# Patient Record
Sex: MALE | Race: WHITE
[De-identification: names, ages, dates, MRNs, and addresses within clinical notes are randomized per-mention and may not be internally consistent; named-entity substitution may affect disease eponyms.]

---

## 2017-04-02 ENCOUNTER — HOSPITAL ENCOUNTER (OUTPATIENT)
Age: 27
Discharge: ANOTHER HOSPITAL | End: 2017-04-02
Payer: MEDICAID

## 2017-04-02 ENCOUNTER — HOSPITAL ENCOUNTER (EMERGENCY)
Age: 27
Discharge: HOME | End: 2017-04-02
Payer: MEDICAID

## 2017-04-02 DIAGNOSIS — E86.0: ICD-10-CM

## 2017-04-02 DIAGNOSIS — D47.3: ICD-10-CM

## 2017-04-02 DIAGNOSIS — K52.9: Primary | ICD-10-CM

## 2017-04-02 DIAGNOSIS — R10.9: Primary | ICD-10-CM

## 2017-04-02 DIAGNOSIS — H66.003: ICD-10-CM

## 2017-04-02 DIAGNOSIS — R51: ICD-10-CM

## 2017-04-02 DIAGNOSIS — Z83.79: ICD-10-CM

## 2017-04-02 PROCEDURE — 74177 CT ABD & PELVIS W/CONTRAST: CPT

## 2017-04-02 PROCEDURE — 83690 ASSAY OF LIPASE: CPT

## 2017-04-02 PROCEDURE — 80053 COMPREHEN METABOLIC PANEL: CPT

## 2017-04-02 PROCEDURE — 81003 URINALYSIS AUTO W/O SCOPE: CPT

## 2017-04-02 PROCEDURE — 86140 C-REACTIVE PROTEIN: CPT

## 2017-04-02 PROCEDURE — 80306 DRUG TEST PRSMV INSTRMNT: CPT

## 2017-04-02 PROCEDURE — 96374 THER/PROPH/DIAG INJ IV PUSH: CPT

## 2017-04-02 PROCEDURE — 96375 TX/PRO/DX INJ NEW DRUG ADDON: CPT

## 2017-04-02 PROCEDURE — 36415 COLL VENOUS BLD VENIPUNCTURE: CPT

## 2017-04-02 PROCEDURE — 70450 CT HEAD/BRAIN W/O DYE: CPT

## 2017-04-02 PROCEDURE — 99284 EMERGENCY DEPT VISIT MOD MDM: CPT

## 2017-04-02 PROCEDURE — 85025 COMPLETE CBC W/AUTO DIFF WBC: CPT

## 2017-04-24 ENCOUNTER — HOSPITAL ENCOUNTER (EMERGENCY)
Age: 27
Discharge: HOME | End: 2017-04-24
Payer: MEDICAID

## 2017-04-24 DIAGNOSIS — K52.9: ICD-10-CM

## 2017-04-24 DIAGNOSIS — K92.1: Primary | ICD-10-CM

## 2017-04-24 PROCEDURE — 96374 THER/PROPH/DIAG INJ IV PUSH: CPT

## 2017-04-24 PROCEDURE — 80053 COMPREHEN METABOLIC PANEL: CPT

## 2017-04-24 PROCEDURE — 96375 TX/PRO/DX INJ NEW DRUG ADDON: CPT

## 2017-04-24 PROCEDURE — 99284 EMERGENCY DEPT VISIT MOD MDM: CPT

## 2017-04-24 PROCEDURE — 36415 COLL VENOUS BLD VENIPUNCTURE: CPT

## 2017-04-24 PROCEDURE — 85651 RBC SED RATE NONAUTOMATED: CPT

## 2017-04-24 PROCEDURE — 85025 COMPLETE CBC W/AUTO DIFF WBC: CPT

## 2017-04-24 PROCEDURE — 96376 TX/PRO/DX INJ SAME DRUG ADON: CPT

## 2017-04-24 PROCEDURE — 83690 ASSAY OF LIPASE: CPT

## 2017-06-15 ENCOUNTER — HOSPITAL ENCOUNTER (OUTPATIENT)
Dept: HOSPITAL 76 - LAB | Age: 27
Discharge: HOME | End: 2017-06-15
Attending: INTERNAL MEDICINE
Payer: MEDICAID

## 2017-06-15 DIAGNOSIS — D47.3: Primary | ICD-10-CM

## 2017-06-15 LAB
BASOPHILS NFR BLD AUTO: 0.1 10^3/UL (ref 0–0.1)
BASOPHILS NFR BLD AUTO: 1.3 %
EOSINOPHIL # BLD AUTO: 0.1 10^3/UL (ref 0–0.7)
EOSINOPHIL NFR BLD AUTO: 2 %
ERYTHROCYTE [DISTWIDTH] IN BLOOD BY AUTOMATED COUNT: 13.9 % (ref 12–15)
HCT VFR BLD AUTO: 43.3 % (ref 42–52)
HGB UR QL STRIP: 14.6 G/DL (ref 14–18)
LYMPHOCYTES # SPEC AUTO: 1.9 10^3/UL (ref 1.5–3.5)
LYMPHOCYTES NFR BLD AUTO: 26.9 %
MCH RBC QN AUTO: 30.2 PG (ref 27–31)
MCHC RBC AUTO-ENTMCNC: 33.7 G/DL (ref 32–36)
MCV RBC AUTO: 89.5 FL (ref 80–94)
MONOCYTES # BLD AUTO: 0.4 10^3/UL (ref 0–1)
MONOCYTES NFR BLD AUTO: 5.1 %
NEUTROPHILS # BLD AUTO: 4.6 10^3/UL (ref 1.5–6.6)
NEUTROPHILS # SNV AUTO: 7 X10^3/UL (ref 4.8–10.8)
NEUTROPHILS NFR BLD AUTO: 64.7 %
NRBC # BLD AUTO: 0 /100WBC
PDW BLD AUTO: 7 FL (ref 7.4–11.4)
RBC MAR: 4.84 10^6/UL (ref 4.7–6.1)
WBC # BLD: 7 X10^3/UL

## 2017-06-15 PROCEDURE — 85025 COMPLETE CBC W/AUTO DIFF WBC: CPT

## 2017-06-15 PROCEDURE — 36415 COLL VENOUS BLD VENIPUNCTURE: CPT

## 2017-06-22 ENCOUNTER — HOSPITAL ENCOUNTER (OUTPATIENT)
Dept: HOSPITAL 76 - LAB | Age: 27
Discharge: HOME | End: 2017-06-22
Attending: INTERNAL MEDICINE
Payer: MEDICAID

## 2017-06-22 DIAGNOSIS — D47.3: Primary | ICD-10-CM

## 2017-06-22 LAB
BASOPHILS NFR BLD AUTO: 0.1 10^3/UL (ref 0–0.1)
BASOPHILS NFR BLD AUTO: 1.6 %
EOSINOPHIL # BLD AUTO: 0.1 10^3/UL (ref 0–0.7)
EOSINOPHIL NFR BLD AUTO: 2.4 %
ERYTHROCYTE [DISTWIDTH] IN BLOOD BY AUTOMATED COUNT: 13.6 % (ref 12–15)
HCT VFR BLD AUTO: 45.2 % (ref 42–52)
HGB UR QL STRIP: 15.1 G/DL (ref 14–18)
LYMPHOCYTES # SPEC AUTO: 1.7 10^3/UL (ref 1.5–3.5)
LYMPHOCYTES NFR BLD AUTO: 30.2 %
MCH RBC QN AUTO: 29.9 PG (ref 27–31)
MCHC RBC AUTO-ENTMCNC: 33.5 G/DL (ref 32–36)
MCV RBC AUTO: 89.1 FL (ref 80–94)
MONOCYTES # BLD AUTO: 0.3 10^3/UL (ref 0–1)
MONOCYTES NFR BLD AUTO: 6 %
NEUTROPHILS # BLD AUTO: 3.5 10^3/UL (ref 1.5–6.6)
NEUTROPHILS # SNV AUTO: 5.8 X10^3/UL (ref 4.8–10.8)
NEUTROPHILS NFR BLD AUTO: 59.8 %
NRBC # BLD AUTO: 0 /100WBC
PDW BLD AUTO: 6.8 FL (ref 7.4–11.4)
RBC MAR: 5.07 10^6/UL (ref 4.7–6.1)
WBC # BLD: 5.8 X10^3/UL

## 2017-06-22 PROCEDURE — 36415 COLL VENOUS BLD VENIPUNCTURE: CPT

## 2017-06-22 PROCEDURE — 85025 COMPLETE CBC W/AUTO DIFF WBC: CPT

## 2017-06-29 ENCOUNTER — HOSPITAL ENCOUNTER (OUTPATIENT)
Dept: HOSPITAL 76 - LAB | Age: 27
Discharge: HOME | End: 2017-06-29
Attending: INTERNAL MEDICINE
Payer: MEDICAID

## 2017-06-29 DIAGNOSIS — D47.3: Primary | ICD-10-CM

## 2017-06-29 LAB
BASOPHILS NFR BLD AUTO: 0.1 10^3/UL (ref 0–0.1)
BASOPHILS NFR BLD AUTO: 1.5 %
EOSINOPHIL # BLD AUTO: 0.2 10^3/UL (ref 0–0.7)
EOSINOPHIL NFR BLD AUTO: 2.4 %
ERYTHROCYTE [DISTWIDTH] IN BLOOD BY AUTOMATED COUNT: 14.2 % (ref 12–15)
HCT VFR BLD AUTO: 43.9 % (ref 42–52)
HGB UR QL STRIP: 14.6 G/DL (ref 14–18)
LYMPHOCYTES # SPEC AUTO: 2.5 10^3/UL (ref 1.5–3.5)
LYMPHOCYTES NFR BLD AUTO: 33 %
MCH RBC QN AUTO: 29.8 PG (ref 27–31)
MCHC RBC AUTO-ENTMCNC: 33.3 G/DL (ref 32–36)
MCV RBC AUTO: 89.7 FL (ref 80–94)
MONOCYTES # BLD AUTO: 0.4 10^3/UL (ref 0–1)
MONOCYTES NFR BLD AUTO: 5.2 %
NEUTROPHILS # BLD AUTO: 4.5 10^3/UL (ref 1.5–6.6)
NEUTROPHILS # SNV AUTO: 7.7 X10^3/UL (ref 4.8–10.8)
NEUTROPHILS NFR BLD AUTO: 57.9 %
NRBC # BLD AUTO: 0 /100WBC
PDW BLD AUTO: 7.2 FL (ref 7.4–11.4)
RBC MAR: 4.9 10^6/UL (ref 4.7–6.1)
WBC # BLD: 7.7 X10^3/UL

## 2017-06-29 PROCEDURE — 85025 COMPLETE CBC W/AUTO DIFF WBC: CPT

## 2017-06-29 PROCEDURE — 36415 COLL VENOUS BLD VENIPUNCTURE: CPT

## 2017-07-06 ENCOUNTER — HOSPITAL ENCOUNTER (OUTPATIENT)
Dept: HOSPITAL 76 - LAB | Age: 27
Discharge: HOME | End: 2017-07-06
Attending: INTERNAL MEDICINE
Payer: MEDICAID

## 2017-07-06 DIAGNOSIS — D47.3: Primary | ICD-10-CM

## 2017-07-06 LAB
BASOPHILS NFR BLD AUTO: 0 10^3/UL (ref 0–0.1)
BASOPHILS NFR BLD AUTO: 0.2 %
EOSINOPHIL # BLD AUTO: 0.1 10^3/UL (ref 0–0.7)
EOSINOPHIL NFR BLD AUTO: 2.2 %
ERYTHROCYTE [DISTWIDTH] IN BLOOD BY AUTOMATED COUNT: 14.4 % (ref 12–15)
HCT VFR BLD AUTO: 45.1 % (ref 42–52)
HGB UR QL STRIP: 15.2 G/DL (ref 14–18)
LYMPHOCYTES # SPEC AUTO: 1.9 10^3/UL (ref 1.5–3.5)
LYMPHOCYTES NFR BLD AUTO: 30.2 %
MCH RBC QN AUTO: 30.2 PG (ref 27–31)
MCHC RBC AUTO-ENTMCNC: 33.8 G/DL (ref 32–36)
MCV RBC AUTO: 89.4 FL (ref 80–94)
MONOCYTES # BLD AUTO: 0.3 10^3/UL (ref 0–1)
MONOCYTES NFR BLD AUTO: 5.5 %
NEUTROPHILS # BLD AUTO: 3.9 10^3/UL (ref 1.5–6.6)
NEUTROPHILS # SNV AUTO: 6.3 X10^3/UL (ref 4.8–10.8)
NEUTROPHILS NFR BLD AUTO: 61.9 %
NRBC # BLD AUTO: 0 /100WBC
PDW BLD AUTO: 7 FL (ref 7.4–11.4)
PLAT MORPH BLD: (no result)
PLATELET BLD QL SMEAR: (no result)
RBC MAR: 5.04 10^6/UL (ref 4.7–6.1)
WBC # BLD: 6.3 X10^3/UL

## 2017-07-06 PROCEDURE — 36415 COLL VENOUS BLD VENIPUNCTURE: CPT

## 2017-07-06 PROCEDURE — 85025 COMPLETE CBC W/AUTO DIFF WBC: CPT

## 2017-07-27 ENCOUNTER — HOSPITAL ENCOUNTER (OUTPATIENT)
Dept: HOSPITAL 76 - LAB | Age: 27
Discharge: HOME | End: 2017-07-27
Attending: INTERNAL MEDICINE
Payer: MEDICAID

## 2017-07-27 DIAGNOSIS — D47.3: Primary | ICD-10-CM

## 2017-07-27 LAB
BASOPHILS NFR BLD AUTO: 0.1 10^3/UL (ref 0–0.1)
BASOPHILS NFR BLD AUTO: 1.2 %
EOSINOPHIL # BLD AUTO: 0.1 10^3/UL (ref 0–0.7)
EOSINOPHIL NFR BLD AUTO: 1.2 %
ERYTHROCYTE [DISTWIDTH] IN BLOOD BY AUTOMATED COUNT: 15.4 % (ref 12–15)
HCT VFR BLD AUTO: 45.2 % (ref 42–52)
HGB UR QL STRIP: 15.2 G/DL (ref 14–18)
LYMPHOCYTES # SPEC AUTO: 1.8 10^3/UL (ref 1.5–3.5)
LYMPHOCYTES NFR BLD AUTO: 25.7 %
MCH RBC QN AUTO: 30.6 PG (ref 27–31)
MCHC RBC AUTO-ENTMCNC: 33.6 G/DL (ref 32–36)
MCV RBC AUTO: 91.2 FL (ref 80–94)
MONOCYTES # BLD AUTO: 0.3 10^3/UL (ref 0–1)
MONOCYTES NFR BLD AUTO: 4.8 %
NEUTROPHILS # BLD AUTO: 4.8 10^3/UL (ref 1.5–6.6)
NEUTROPHILS # SNV AUTO: 7.2 X10^3/UL (ref 4.8–10.8)
NEUTROPHILS NFR BLD AUTO: 67.1 %
NRBC # BLD AUTO: 0 /100WBC
PDW BLD AUTO: 7.1 FL (ref 7.4–11.4)
PLAT MORPH BLD: (no result)
PLATELET BLD QL SMEAR: (no result)
RBC MAR: 4.95 10^6/UL (ref 4.7–6.1)
WBC # BLD: 7.2 X10^3/UL

## 2017-07-27 PROCEDURE — 85025 COMPLETE CBC W/AUTO DIFF WBC: CPT

## 2017-07-27 PROCEDURE — 36415 COLL VENOUS BLD VENIPUNCTURE: CPT

## 2017-08-03 ENCOUNTER — HOSPITAL ENCOUNTER (OUTPATIENT)
Dept: HOSPITAL 76 - LAB | Age: 27
Discharge: HOME | End: 2017-08-03
Attending: INTERNAL MEDICINE
Payer: MEDICAID

## 2017-08-03 DIAGNOSIS — D47.1: Primary | ICD-10-CM

## 2017-08-03 LAB
BASOPHILS NFR BLD AUTO: 0.1 10^3/UL (ref 0–0.1)
BASOPHILS NFR BLD AUTO: 2.1 %
EOSINOPHIL # BLD AUTO: 0.1 10^3/UL (ref 0–0.7)
EOSINOPHIL NFR BLD AUTO: 1.4 %
ERYTHROCYTE [DISTWIDTH] IN BLOOD BY AUTOMATED COUNT: 15.6 % (ref 12–15)
HCT VFR BLD AUTO: 44.2 % (ref 42–52)
HGB UR QL STRIP: 15 G/DL (ref 14–18)
LYMPHOCYTES # SPEC AUTO: 1.9 10^3/UL (ref 1.5–3.5)
LYMPHOCYTES NFR BLD AUTO: 30.4 %
MCH RBC QN AUTO: 30.9 PG (ref 27–31)
MCHC RBC AUTO-ENTMCNC: 34 G/DL (ref 32–36)
MCV RBC AUTO: 90.9 FL (ref 80–94)
MONOCYTES # BLD AUTO: 0.2 10^3/UL (ref 0–1)
MONOCYTES NFR BLD AUTO: 3.9 %
NEUTROPHILS # BLD AUTO: 3.9 10^3/UL (ref 1.5–6.6)
NEUTROPHILS # SNV AUTO: 6.2 X10^3/UL (ref 4.8–10.8)
NEUTROPHILS NFR BLD AUTO: 62.2 %
NRBC # BLD AUTO: 0.1 /100WBC
PDW BLD AUTO: 7 FL (ref 7.4–11.4)
PLAT MORPH BLD: (no result)
PLATELET BLD QL SMEAR: (no result)
RBC MAR: 4.87 10^6/UL (ref 4.7–6.1)
WBC # BLD: 6.2 X10^3/UL

## 2017-08-03 PROCEDURE — 85025 COMPLETE CBC W/AUTO DIFF WBC: CPT

## 2017-08-03 PROCEDURE — 36415 COLL VENOUS BLD VENIPUNCTURE: CPT

## 2017-11-24 ENCOUNTER — HOSPITAL ENCOUNTER (EMERGENCY)
Dept: HOSPITAL 76 - ED | Age: 27
Discharge: HOME | End: 2017-11-24
Payer: MEDICAID

## 2017-11-24 VITALS — SYSTOLIC BLOOD PRESSURE: 118 MMHG | DIASTOLIC BLOOD PRESSURE: 85 MMHG

## 2017-11-24 DIAGNOSIS — K02.9: Primary | ICD-10-CM

## 2017-11-24 PROCEDURE — 99283 EMERGENCY DEPT VISIT LOW MDM: CPT

## 2017-11-24 NOTE — ED PHYSICIAN DOCUMENTATION
History of Present Illness





- Stated complaint


Stated Complaint: HEADACHE,TOOTHACHE





- Chief complaint


Chief Complaint: General





- History obtained from


History obtained from: Patient (pt is here for evaluation of left lower tooth 

pain.  he states that for the past couple days he has had increasing pain.  

states that his headache is from the pain today.  he has a dentis appointment 

on monday.  no fevers.)





Review of Systems


Constitutional: denies: Fever, Chills


Throat: reports: Dental pain / toothache.  denies: Oral lesions / sores, Sore 

throat, Swollen tonsils, Swallowed foreign body


Cardiac: denies: Chest pain / pressure


Respiratory: denies: Dyspnea


Neurologic: reports: Headache.  denies: Head injury





PD PAST MEDICAL HISTORY





- Past Medical History


Cardiovascular: None


Respiratory: None


Neuro: None


Endocrine/Autoimmune: None


GI: None


: None


HEENT: None


Psych: Depression, Anxiety


Musculoskeletal: None


Derm: None





- Past Surgical History


Past Surgical History: No





- Present Medications


Home Medications: 


 Ambulatory Orders











 Medication  Instructions  Recorded  Confirmed


 


Bupropion HCl [Bupropion Xl] 150 mg PO DAILY 04/02/17 11/13/17


 


Citalopram [CeleXA] 20 mg PO DAILY 04/02/17 11/13/17


 


Dexamethasone [Decadron] 4 mg PO DAILY #20 tablet 04/24/17 11/13/17


 


Hydrocodone/Acetaminophen [Norco 1 each PO Q6H PRN #20 tablet 04/24/17 11/13/17





5-325 Tablet]   


 


Ondansetron HCl [Zofran] 4 mg PO Q6H PRN #20 tablet 04/24/17 11/13/17


 


Clindamycin HCl [Clindamycin 300MG 300 mg PO TID #30 capsule 11/24/17 





CAP]   














- Allergies


Allergies/Adverse Reactions: 


 Allergies











Allergy/AdvReac Type Severity Reaction Status Date / Time


 


No Known Drug Allergies Allergy   Verified 11/24/17 21:52














- Social History


Does the pt smoke?: No


Smoking Status: Never smoker


Does the pt drink ETOH?: No


Does the pt have substance abuse?: No





- Immunizations


Immunizations are current?: Yes





- POLST


Patient has POLST: No





PD ED PE NORMAL





- Vitals


Vital signs reviewed: Yes





- General


General: Alert and oriented X 3, No acute distress, Well developed/nourished





- HEENT


HEENT: Atraumatic, Ears normal, Moist mucous membranes, Other (pt with poor 

dention has multiple teeth with caries and left lower teeth (1st molar with 

exposed root). )





- Cardiac


Cardiac: RRR, No murmur





- Respiratory


Respiratory: No respiratory distress





- Derm


Derm: Normal color, No rash





- Neuro


Neuro: Alert and oriented X 3





Results





- Vitals


Vitals: 





 Vital Signs - 24 hr











  11/24/17





  21:50


 


Temperature 36.4 C L


 


Heart Rate 77


 


Respiratory 18





Rate 


 


Blood Pressure 159/89 H


 


O2 Saturation 100








 Oxygen











O2 Source                      Room air

















PD MEDICAL DECISION MAKING





- ED course


Complexity details: d/w patient


ED course: 





pt with very poor dentition.  2nd molar left lower jaw has exposed dentin.  

that is the tooth that is hurting the pt.  offered dental block but informed 

him that it would only last 6 hours.  placed Ca hydroxide paste over the area 

with relief of much of his pain.  informed him that this may fall out.  

informed him that he needed to follow up with dental on monday.  will send home 

with ABX.  pt has pain medication at home.  





Departure





- Departure


Disposition: 01 Home, Self Care


Clinical Impression: 


 Dental caries





Condition: Good


Instructions:  ED Cavity Dental, ED Tooth Pain


Follow-Up: 


dental, provider [Other]


Prescriptions: 


Clindamycin HCl [Clindamycin 300MG CAP] 300 mg PO TID #30 capsule


Comments: 


follow up with dental on monday.  Return to the ER for any new or worsening 

symptoms.

## 2017-12-21 ENCOUNTER — HOSPITAL ENCOUNTER (EMERGENCY)
Dept: HOSPITAL 76 - ED | Age: 27
Discharge: HOME | End: 2017-12-21
Payer: MEDICAID

## 2017-12-21 VITALS — SYSTOLIC BLOOD PRESSURE: 108 MMHG | DIASTOLIC BLOOD PRESSURE: 60 MMHG

## 2017-12-21 DIAGNOSIS — L03.032: Primary | ICD-10-CM

## 2017-12-21 DIAGNOSIS — R19.7: ICD-10-CM

## 2017-12-21 DIAGNOSIS — R51: ICD-10-CM

## 2017-12-21 PROCEDURE — 96372 THER/PROPH/DIAG INJ SC/IM: CPT

## 2017-12-21 PROCEDURE — 70450 CT HEAD/BRAIN W/O DYE: CPT

## 2017-12-21 PROCEDURE — 99283 EMERGENCY DEPT VISIT LOW MDM: CPT

## 2017-12-21 NOTE — CT PRELIMINARY REPORT
Accession: [id number]

Exam: CT HEAD W/O

 

IMPRESSION: Normal head CT.

 

[facility]

 

SITE ID: [id number]

## 2017-12-21 NOTE — CT REPORT
EXAM:

CT HEAD

 

EXAM DATE: 12/21/2017 02:19 AM.

 

CLINICAL HISTORY: Headache.

 

COMPARISON: None.

 

TECHNIQUE: Multiaxial CT images were obtained from the foramen magnum to the vertex. Reformats: Coron
al. IV contrast: None.

 

In accordance with CT protocol optimization, one or more of the following dose reduction techniques w
ere utilized for this exam: automated exposure control, adjustment of mA and/or KV based on patient s
ize, or use of iterative reconstructive technique.

 

FINDINGS:

Parenchyma: No intraparenchymal hemorrhage. No evidence of mass, midline shift, or CT findings of inf
arction. Gray-white differentiation is distinct. 

 

Extraaxial Spaces: Normal for age. No subdural or epidural collections identified. 

 

Ventricles: Normal in size and position.

 

Sinuses and Orbits: Imaged paranasal sinuses, orbits, and mastoids show no significant abnormality.

 

Bones: No evidence of fracture or calvarial defect.

 

Other: None.

 

IMPRESSION: Normal head CT.

 

[facility]

Referring Provider Line: [phone]

 

SITE ID: [id number]

## 2017-12-21 NOTE — ED PHYSICIAN DOCUMENTATION
PD HPI HEADACHE





- Stated complaint


Stated Complaint: HEADACHE





- Chief complaint


Chief Complaint: Wound





- History obtained from


History obtained from: Patient, Family





- History of Present Illness


Timing - onset: Other (various timeframes)


Timing - details: Gradual onset


Pain level now: 8


Worst headache ever?: No: Worst headache ever?


Location: [facility]


Associated symptoms: No: Fever, Vomiting, Weakness, Numbness


Recently seen: Not recently seen





- Additional information


Additional information: 





multiple c/o:


1) headache x several hours, global. has had similar headaches in the past 

month. tylenol, ibuprofen without adequate relief


2) abdominal cramping and frequent loose stools x over a month


3) swelling, redness left third toe since yesterday; recently stepped on a 

toothpick which punctured the skin next to the nail





Review of Systems


Constitutional: denies: Fever


Eyes: reports: Reviewed and negative


Cardiac: reports: Reviewed and negative


Respiratory: reports: Reviewed and negative


GI: reports: Abdominal Pain, Diarrhea.  denies: Nausea, Vomiting


Musculoskeletal: reports: Extremity pain, Extremity swelling


Neurologic: reports: Headache.  denies: Generalized weakness, Focal weakness, 

Numbness





PD PAST MEDICAL HISTORY





- Past Medical History


Past Medical History: Yes


Cardiovascular: None


Respiratory: None


Neuro: Headache/migraine


Endocrine/Autoimmune: None


GI: None


: None


HEENT: None


Psych: Depression, Anxiety


Musculoskeletal: None


Derm: None





- Past Surgical History


Past Surgical History: No





- Present Medications


Home Medications: 


 Ambulatory Orders











 Medication  Instructions  Recorded  Confirmed


 


Bupropion HCl [Bupropion Xl] 150 mg PO DAILY 04/02/17 12/21/17


 


Citalopram [CeleXA] 20 mg PO DAILY 04/02/17 12/21/17


 


Clindamycin HCl [Cleocin HCl] 300 mg PO QID #27 capsule 12/21/17 


 


Diphenoxylate HCl/Atropine 1 each PO QID PRN #20 tablet 12/21/17 





[Diphenoxylate-Atrop 2.5-0.025]   


 


Hydroxyurea 2 cap PO DAILY 12/21/17 12/21/17


 


oxyCODONE/ACET 5/325 [Percocet 5 1 - 2 each PO Q6H PRN #14 tablet 12/21/17 





mg/325 mg]   














- Allergies


Allergies/Adverse Reactions: 


 Allergies











Allergy/AdvReac Type Severity Reaction Status Date / Time


 


No Known Drug Allergies Allergy   Verified 12/21/17 01:02














- Social History


Does the pt smoke?: No


Smoking Status: Never smoker


Does the pt drink ETOH?: No


Does the pt have substance abuse?: No





- Immunizations


Immunizations are current?: Yes





- POLST


Patient has POLST: No





PD ED PE NORMAL





- Vitals


Vital signs reviewed: Yes





- General


General: Alert and oriented X 3, No acute distress, Well developed/nourished





- Neck


Neck: Supple, no meningeal sign





- Cardiac


Cardiac: RRR, No murmur





- Respiratory


Respiratory: No respiratory distress, Clear bilaterally





- Abdomen


Abdomen: Normal bowel sounds, Soft, Non tender, Non distended





- Neuro


Neuro: Alert and oriented X 3, CNs 2-12 intact, No motor deficit, No sensory 

deficit, Normal speech


Eye Opening: Spontaneous


Motor: Obeys Commands


Verbal: Oriented


GCS Score: 15





PD ED PE EXPANDED





- Extremities


Extremities: Other (swelling, erythema (confluent), tenderness left third toe 

without fluctuance; no FB visualized. area of maximal swelling and erythema is 

adjacent to toenail where a small puncture wound is seen)





Results





- Vitals


Vitals: 


 Vital Signs - 24 hr











  12/21/17 12/21/17 12/21/17





  00:55 02:43 04:11


 


Temperature 36.4 C L  36.5 C


 


Heart Rate 95 86 79


 


Respiratory 16 16 14





Rate   


 


Blood Pressure 132/81 H 118/71 108/60


 


O2 Saturation 98 99 95








 Oxygen











O2 Source                      Room air

















- Rads (name of study)


  ** CT head


Radiology: Prelim report reviewed, See rad report





PD MEDICAL DECISION MAKING





- ED course


Complexity details: reviewed results, re-evaluated patient, considered 

differential, d/w patient, d/w family





Departure





- Departure


Disposition: 01 Home, Self Care


Clinical Impression: 


Cellulitis of toe


Qualifiers:


 Laterality: left Qualified Code(s): L03.032 - Cellulitis of left toe





Condition: Good


Instructions:  Diarrhea, ED Infec Skin Cellulitis, ED Cephalgia Unspecified


Follow-Up: 


Jermain Mclaughlin MD [Primary Care Provider] - 


Prescriptions: 


Clindamycin HCl [Cleocin HCl] 300 mg PO QID #27 capsule


Diphenoxylate HCl/Atropine [Diphenoxylate-Atrop 2.5-0.025] 1 each PO QID PRN #

20 tablet


 PRN Reason: Diarrhea


oxyCODONE/ACET 5/325 [Percocet 5 mg/325 mg] 1 - 2 each PO Q6H PRN #14 tablet


 PRN Reason: Headache


Discharge Date/Time: 12/21/17 04:12

## 2017-12-26 ENCOUNTER — HOSPITAL ENCOUNTER (EMERGENCY)
Dept: HOSPITAL 76 - ED | Age: 27
Discharge: HOME | End: 2017-12-26
Payer: MEDICAID

## 2017-12-26 ENCOUNTER — HOSPITAL ENCOUNTER (OUTPATIENT)
Dept: HOSPITAL 76 - EMS | Age: 27
Discharge: ANOTHER HOSPITAL | End: 2017-12-26
Attending: SURGERY
Payer: MEDICAID

## 2017-12-26 VITALS — DIASTOLIC BLOOD PRESSURE: 92 MMHG | SYSTOLIC BLOOD PRESSURE: 145 MMHG

## 2017-12-26 DIAGNOSIS — R51: Primary | ICD-10-CM

## 2017-12-26 DIAGNOSIS — D47.3: Primary | ICD-10-CM

## 2017-12-26 LAB
ALBUMIN/GLOB SERPL: 1.6 {RATIO} (ref 1–2.2)
ANION GAP SERPL CALCULATED.4IONS-SCNC: 6 MMOL/L (ref 6–13)
BASOPHILS NFR BLD AUTO: 0.1 10^3/UL (ref 0–0.1)
BASOPHILS NFR BLD AUTO: 1.2 %
BILIRUB BLD-MCNC: 0.3 MG/DL (ref 0.2–1)
BUN SERPL-MCNC: 14 MG/DL (ref 6–20)
CALCIUM UR-MCNC: 8.9 MG/DL (ref 8.5–10.3)
CHLORIDE SERPL-SCNC: 100 MMOL/L (ref 101–111)
CO2 SERPL-SCNC: 30 MMOL/L (ref 21–32)
CREAT SERPLBLD-SCNC: 1 MG/DL (ref 0.6–1.2)
EOSINOPHIL # BLD AUTO: 0.2 10^3/UL (ref 0–0.7)
EOSINOPHIL NFR BLD AUTO: 2.3 %
ERYTHROCYTE [DISTWIDTH] IN BLOOD BY AUTOMATED COUNT: 13.3 % (ref 12–15)
GFRSERPLBLD MDRD-ARVRAT: 90 ML/MIN/{1.73_M2} (ref 89–?)
GLOBULIN SER-MCNC: 2.7 G/DL (ref 2.1–4.2)
GLUCOSE SERPL-MCNC: 98 MG/DL (ref 70–100)
HCT VFR BLD AUTO: 41.3 % (ref 42–52)
HGB UR QL STRIP: 14.1 G/DL (ref 14–18)
LIPASE SERPL-CCNC: 26 U/L (ref 22–51)
LYMPHOCYTES # SPEC AUTO: 1.9 10^3/UL (ref 1.5–3.5)
LYMPHOCYTES NFR BLD AUTO: 25.7 %
MCH RBC QN AUTO: 32 PG (ref 27–31)
MCHC RBC AUTO-ENTMCNC: 34.1 G/DL (ref 32–36)
MCV RBC AUTO: 93.8 FL (ref 80–94)
MONOCYTES # BLD AUTO: 0.4 10^3/UL (ref 0–1)
MONOCYTES NFR BLD AUTO: 5.9 %
NEUTROPHILS # BLD AUTO: 4.7 10^3/UL (ref 1.5–6.6)
NEUTROPHILS # SNV AUTO: 7.3 X10^3/UL (ref 4.8–10.8)
NEUTROPHILS NFR BLD AUTO: 64.9 %
NRBC # BLD AUTO: 0.1 /100WBC
PDW BLD AUTO: 7.1 FL (ref 7.4–11.4)
PLAT MORPH BLD: (no result)
PLATELET BLD QL SMEAR: (no result)
POTASSIUM SERPL-SCNC: 4.1 MMOL/L (ref 3.5–5)
PROT SPEC-MCNC: 6.9 G/DL (ref 6.7–8.2)
RBC MAR: 4.4 10^6/UL (ref 4.7–6.1)
SODIUM SERPLBLD-SCNC: 136 MMOL/L (ref 135–145)
WBC # BLD: 7.3 X10^3/UL
WBC MORPH BLD: (no result)

## 2017-12-26 PROCEDURE — 96374 THER/PROPH/DIAG INJ IV PUSH: CPT

## 2017-12-26 PROCEDURE — 36415 COLL VENOUS BLD VENIPUNCTURE: CPT

## 2017-12-26 PROCEDURE — 96361 HYDRATE IV INFUSION ADD-ON: CPT

## 2017-12-26 PROCEDURE — 83690 ASSAY OF LIPASE: CPT

## 2017-12-26 PROCEDURE — 96375 TX/PRO/DX INJ NEW DRUG ADDON: CPT

## 2017-12-26 PROCEDURE — 99285 EMERGENCY DEPT VISIT HI MDM: CPT

## 2017-12-26 PROCEDURE — 85025 COMPLETE CBC W/AUTO DIFF WBC: CPT

## 2017-12-26 PROCEDURE — 80053 COMPREHEN METABOLIC PANEL: CPT

## 2017-12-26 PROCEDURE — 70450 CT HEAD/BRAIN W/O DYE: CPT

## 2017-12-26 PROCEDURE — 99283 EMERGENCY DEPT VISIT LOW MDM: CPT

## 2017-12-26 NOTE — ED PHYSICIAN DOCUMENTATION
PD HPI HEADACHE





- Stated complaint


Stated Complaint: HA





- Chief complaint


Chief Complaint: General





- History obtained from


History obtained from: Patient, EMS





- History of Present Illness


Timing - onset: Chronic


Timing - details: Gradual onset, Still present


Worst headache ever?: Worst headache ever? (no)


Location: [facility]


Quality: Aching


Associated symptoms: Nausea.  No: Fever, Vomiting


Similar symptoms before: Work up / diagnostics, Treatment


Recently seen: Emergency Dept





- Additional information


Additional information: 





Patient is a 27 year old male with a history of essential thrombocytosis who is 

presenting to the emergency department for headaches.  patient states that he 

frequently gets headaches throughout most of his life.  He states that it is 

often secondary to he elevated platelet count.  Patient states that he has been 

taking motrin tylenol and the hydrocodone he had been prescribed.  Patient 

states that this is similar to his previous headaches.  Patient was recently 

seen in the emergency department where his last CT was with normal limits.  





Review of Systems


Constitutional: denies: Fever, Chills


Eyes: denies: Decreased vision, Photophobia


Ears: denies: Ear pain, Drainage/discharge


Nose: denies: Congestion, Epistaxis


Throat: denies: Sore throat


Cardiac: denies: Chest pain / pressure, Palpitations, Calf pain


Respiratory: denies: Dyspnea, Cough, Wheezing


GI: denies: Nausea, Vomiting


: reports: Reviewed and negative


Skin: denies: Rash, Lesions


Musculoskeletal: denies: Neck pain, Back pain, Extremity pain


Neurologic: reports: Headache.  denies: Confused, Altered mental status, Head 

injury, LOC





PD PAST MEDICAL HISTORY





- Past Medical History


Past Medical History: Yes


Cardiovascular: None


Respiratory: None


Neuro: Headache/migraine


Endocrine/Autoimmune: None


GI: None


: None


HEENT: None


Psych: Depression, Anxiety


Musculoskeletal: None


Derm: None





- Past Surgical History


Past Surgical History: No





- Present Medications


Home Medications: 


 Ambulatory Orders











 Medication  Instructions  Recorded  Confirmed


 


Bupropion HCl [Bupropion Xl] 150 mg PO DAILY 04/02/17 12/21/17


 


Citalopram [CeleXA] 20 mg PO DAILY 04/02/17 12/21/17


 


Clindamycin HCl [Cleocin HCl] 300 mg PO QID #27 capsule 12/21/17 


 


Diphenoxylate HCl/Atropine 1 each PO QID PRN #20 tablet 12/21/17 





[Diphenoxylate-Atrop 2.5-0.025]   


 


Hydroxyurea 2 cap PO DAILY 12/21/17 12/21/17


 


oxyCODONE/ACET 5/325 [Percocet 5 1 - 2 each PO Q6H PRN #14 tablet 12/21/17 





mg/325 mg]   














- Allergies


Allergies/Adverse Reactions: 


 Allergies











Allergy/AdvReac Type Severity Reaction Status Date / Time


 


No Known Drug Allergies Allergy   Verified 12/21/17 01:02














- Social History


Does the pt smoke?: No


Smoking Status: Never smoker


Does the pt drink ETOH?: No


Does the pt have substance abuse?: No





- Immunizations


Immunizations are current?: Yes





- POLST


Patient has POLST: No





PD ED PE NORMAL





- Vitals


Vital signs reviewed: Yes





- General


General: Alert and oriented X 3, No acute distress, Well developed/nourished





- HEENT


HEENT: Atraumatic, PERRL, Moist mucous membranes, Pharynx benign





- Neck


Neck: Supple, no meningeal sign, No JVD





- Cardiac


Cardiac: RRR, No murmur





- Respiratory


Respiratory: No respiratory distress, Clear bilaterally





- Abdomen


Abdomen: Soft, Non tender, Non distended





- Derm


Derm: Normal color, Warm and dry, No rash





- Extremities


Extremities: No deformity





- Neuro


Neuro: Alert and oriented X 3, CNs 2-12 intact, No motor deficit, No sensory 

deficit, Normal speech


Eye Opening: Spontaneous


Motor: Obeys Commands


Verbal: Oriented


GCS Score: 15





- Psych


Psych: Normal mood





PD ED PE EXPANDED





- General


General: Alert, In Pain





Results





- Vitals


Vitals: 


 Vital Signs - 24 hr











  12/26/17





  08:40


 


Temperature 36.3 C L


 


Heart Rate 77


 


Respiratory 18





Rate 


 


Blood Pressure 145/92 H


 


O2 Saturation 97








 Oxygen











O2 Source                      Room air

















- Labs


Labs: 


 Laboratory Tests











  12/26/17 12/26/17





  09:10 09:10


 


WBC  7.3 


 


RBC  4.40 L 


 


Hgb  14.1 


 


Hct  41.3 L 


 


MCV  93.8 


 


MCH  32.0 H 


 


MCHC  34.1 


 


RDW  13.3 


 


Plt Count  1127 H* 


 


MPV  7.1 L 


 


Neut #  4.7 


 


Lymph #  1.9 


 


Mono #  0.4 


 


Eos #  0.2 


 


Baso #  0.1 


 


Absolute Nucleated RBC  0.00 


 


Nucleated RBC %  0.1 


 


Manual Slide Review  Indicated 


 


WBC Morphology  NORMAL APPEARANCE 


 


Platelet Estimate  INCREASED (>450,000) 


 


Platelet Morphology  1+ LARGE PLATELETS 


 


RBC Morph Micro Appear  NORMAL APPEARANCE 


 


Sodium   136


 


Potassium   4.1


 


Chloride   100 L


 


Carbon Dioxide   30


 


Anion Gap   6.0


 


BUN   14


 


Creatinine   1.0


 


Estimated GFR (MDRD)   90


 


Glucose   98


 


Calcium   8.9


 


Total Bilirubin   0.3


 


AST   33


 


ALT   47


 


Alkaline Phosphatase   72


 


Total Protein   6.9


 


Albumin   4.2


 


Globulin   2.7


 


Albumin/Globulin Ratio   1.6


 


Lipase   26














- Rads (name of study)


  ** ct head


Radiology: Final report received (no acute disease process)





PD MEDICAL DECISION MAKING





- ED course


Complexity details: reviewed old records, reviewed results, re-evaluated patient

, considered differential, d/w patient, d/w consultant


ED course: 





Patient was seen and examined at bedside.  IV access was gained and labs were 

drawn.  pateint was treated with toradol, reglan, bendaryl and IV fluids.  when 

patient's labs came back he was found to have platelets of 1127, which was 

slightly elevated from his baseline.  Case was discussed with covering on call 

heme/onc and they stated that even though he had a Ct a few days ago she would 

recommend a repeat Ct.  repeat ct was ordered and was within normal limits.  

patient still had pain and was treated with dilaudid.  Upon re-evaluation 

patient stated he was feeling better.  Patient stated that he would call his 

doctor tomorrow to schedule a follow up appointment.  Upon discharge patient 

was awake, and alert with no neurological deficits.  





Departure





- Departure


Disposition: 01 Home, Self Care


Clinical Impression: 


 Essential thrombocytosis





Condition: Good


Instructions:  ED Cephalgia Unspecified


Follow-Up: 


Dk Harvey MD [Provider Admit Priv/Credential] - Tomorrow


Comments: 


Your diagnostics today revealed chronically high platelets but was otherwise 

normal.  It is important that you call your heme/onc doctor tomorrow.  You 

should stay well hydrated and take your home pain medications.  You should call 

your doctor tomorrow to set up a follow up appointment.  You may return to the 

emergency department at any time for new, worsening or uncontrollable symptoms.

## 2017-12-26 NOTE — CT PRELIMINARY REPORT
Accession: [id number]

Exam: CT HEAD W/O

 

IMPRESSION: Normal head CT.

 

[facility]

 

SITE ID: [id number]

## 2018-01-19 ENCOUNTER — HOSPITAL ENCOUNTER (EMERGENCY)
Dept: HOSPITAL 76 - ED | Age: 28
Discharge: HOME | End: 2018-01-19
Payer: MEDICAID

## 2018-01-19 VITALS — DIASTOLIC BLOOD PRESSURE: 59 MMHG | SYSTOLIC BLOOD PRESSURE: 111 MMHG

## 2018-01-19 DIAGNOSIS — D47.3: Primary | ICD-10-CM

## 2018-01-19 DIAGNOSIS — G43.909: ICD-10-CM

## 2018-01-19 LAB
BASOPHILS NFR BLD AUTO: 0.1 10^3/UL (ref 0–0.1)
BASOPHILS NFR BLD AUTO: 1.3 %
EOSINOPHIL # BLD AUTO: 0.1 10^3/UL (ref 0–0.7)
EOSINOPHIL NFR BLD AUTO: 0.6 %
ERYTHROCYTE [DISTWIDTH] IN BLOOD BY AUTOMATED COUNT: 13.4 % (ref 12–15)
HGB UR QL STRIP: 14.4 G/DL (ref 14–18)
LYMPHOCYTES # SPEC AUTO: 1 10^3/UL (ref 1.5–3.5)
LYMPHOCYTES NFR BLD AUTO: 11.5 %
MCH RBC QN AUTO: 30.7 PG (ref 27–31)
MCHC RBC AUTO-ENTMCNC: 34.3 G/DL (ref 32–36)
MCV RBC AUTO: 89.7 FL (ref 80–94)
MONOCYTES # BLD AUTO: 0.4 10^3/UL (ref 0–1)
MONOCYTES NFR BLD AUTO: 4.1 %
NEUTROPHILS # BLD AUTO: 7.5 10^3/UL (ref 1.5–6.6)
NEUTROPHILS # SNV AUTO: 9.1 X10^3/UL (ref 4.8–10.8)
NEUTROPHILS NFR BLD AUTO: 82.5 %
PDW BLD AUTO: 7 FL (ref 7.4–11.4)
PLATELET # BLD: 1266 10^3/UL (ref 130–450)
RBC MAR: 4.67 10^6/UL (ref 4.7–6.1)

## 2018-01-19 PROCEDURE — 96361 HYDRATE IV INFUSION ADD-ON: CPT

## 2018-01-19 PROCEDURE — 96374 THER/PROPH/DIAG INJ IV PUSH: CPT

## 2018-01-19 PROCEDURE — 96375 TX/PRO/DX INJ NEW DRUG ADDON: CPT

## 2018-01-19 PROCEDURE — 99283 EMERGENCY DEPT VISIT LOW MDM: CPT

## 2018-01-19 PROCEDURE — 99284 EMERGENCY DEPT VISIT MOD MDM: CPT

## 2018-01-19 PROCEDURE — 85025 COMPLETE CBC W/AUTO DIFF WBC: CPT

## 2018-01-19 PROCEDURE — 36415 COLL VENOUS BLD VENIPUNCTURE: CPT

## 2018-01-19 NOTE — ED PHYSICIAN DOCUMENTATION
PD HPI HEADACHE





- Stated complaint


Stated Complaint: H/A,NAUSEA





- Chief complaint


Chief Complaint: Neuro





- History obtained from


History obtained from: Patient, Family





- History of Present Illness


Timing - onset: Yesterday


Timing - details: Gradual onset, Still present


Location: [facility]


Quality: Aching


Associated symptoms: Nausea, Vomiting.  No: Fever, Stiff neck


Improved by: Rest


Similar symptoms before: Work up / diagnostics, Treatment


Recently seen: Emergency Dept





- Additional information


Additional information: 





Patient is a 27 year old male with a history of essential thrombocytosis who is 

presenting to the emergency department for headaches and nausea.  Patient gets 

recurrent headaches and this is similar to his prior headaches.  Patient took 

zofran and 





Review of Systems


Constitutional: denies: Fever, Chills


Eyes: reports: Photophobia.  denies: Decreased vision


Ears: denies: Ear pain


Nose: denies: Congestion


Throat: denies: Dental pain / toothache, Sore throat


Cardiac: denies: Chest pain / pressure


GI: reports: Nausea.  denies: Vomiting, Constipation, Diarrhea


: reports: Reviewed and negative


Skin: reports: Reviewed and negative


Musculoskeletal: denies: Neck pain


Neurologic: reports: Headache.  denies: Generalized weakness, Focal weakness, 

Numbness, Syncope, Altered mental status, Head injury, LOC





PD PAST MEDICAL HISTORY





- Past Medical History


Cardiovascular: None


Respiratory: None


Neuro: Headache/migraine


Endocrine/Autoimmune: None


GI: None


: None


HEENT: None


Psych: Depression, Anxiety


Musculoskeletal: None


Derm: None





- Past Surgical History


Past Surgical History: No





- Present Medications


Home Medications: 


 Ambulatory Orders











 Medication  Instructions  Recorded  Confirmed


 


Bupropion HCl [Bupropion Xl] 150 mg PO DAILY 04/02/17 01/19/18


 


Citalopram [CeleXA] 20 mg PO DAILY 04/02/17 01/19/18


 


Hydroxyurea 2 cap PO DAILY 12/21/17 01/19/18


 


Ondansetron Odt [Zofran Odt] 4 mg TL Q6H PRN #30 tablet 01/19/18 














- Allergies


Allergies/Adverse Reactions: 


 Allergies











Allergy/AdvReac Type Severity Reaction Status Date / Time


 


No Known Drug Allergies Allergy   Verified 01/19/18 04:16














- Social History


Does the pt smoke?: No


Smoking Status: Never smoker


Does the pt drink ETOH?: No


Does the pt have substance abuse?: No





- Immunizations


Immunizations are current?: Yes





- POLST


Patient has POLST: No





PD ED PE NORMAL





- Vitals


Vital signs reviewed: Yes





- General


General: Alert and oriented X 3, Well developed/nourished





- HEENT


HEENT: Atraumatic, PERRL, Pharynx benign





- Neck


Neck: Supple, no meningeal sign, No JVD





- Cardiac


Cardiac: RRR





- Respiratory


Respiratory: No respiratory distress





- Abdomen


Abdomen: Soft, Non tender, Non distended





- Derm


Derm: Normal color, Warm and dry, No rash





- Extremities


Extremities: No deformity, Normal ROM s pain





- Neuro


Neuro: Alert and oriented X 3, CNs 2-12 intact, No motor deficit, No sensory 

deficit, Normal speech


Eye Opening: Spontaneous


Motor: Obeys Commands


Verbal: Oriented


GCS Score: 15





- Psych


Psych: Normal mood





PD ED PE EXPANDED





- General


General: Alert, No acute distress, In Pain





Results





- Vitals


Vitals: 


 Vital Signs - 24 hr











  01/19/18 01/19/18





  04:10 05:24


 


Temperature 36.5 C 


 


Heart Rate 63 55 L


 


Respiratory 18 16





Rate  


 


Blood Pressure 114/76 107/62


 


O2 Saturation 98 100








 Oxygen











O2 Source                      Room air

















- Labs


Labs: 


 Laboratory Tests











  01/19/18





  04:50


 


WBC  9.1


 


RBC  4.67 L


 


Hgb  14.4


 


Hct  41.9 L


 


MCV  89.7


 


MCH  30.7


 


MCHC  34.3


 


RDW  13.4


 


Plt Count  1266 H*


 


MPV  7.0 L


 


Neut #  7.5 H


 


Lymph #  1.0 L


 


Mono #  0.4


 


Eos #  0.1


 


Baso #  0.1


 


Absolute Nucleated RBC  0.01


 


Nucleated RBC %  0.2














PD MEDICAL DECISION MAKING





- ED course


Complexity details: reviewed old records, reviewed results, re-evaluated patient

, considered differential, d/w patient, d/w family


ED course: 





Patient was seen and examined at bedside.  Patient's headache was similar to 

previous.  IV access was gained, labs were drawn.  Patient was treated with 

toradol, reglan, benadryl, morphine and IV fluids.  When patient's labs came 

back he continued to have thrombocytosis but less than a 10% change from 

previous.  Patient responded well to the therapy and headache improved.  

patient stated that he did have some nausea and was treated with phenegran 

which did help improve his nausea.  Patient had no focal neurological deficits.

  Patient was with his sister who stated that they would make sure that he made 

his appointment.   patient required no further work up at this time and was 

stable for discharge with outpatient follow up.  





Departure





- Departure


Disposition: 01 Home, Self Care


Clinical Impression: 


 Essential thrombocytosis, Migraine





Condition: Good


Instructions:  ED Headache Migraine


Follow-Up: 


Jermain Mclaughlin MD [Primary Care Provider] - Within 3 Days


Prescriptions: 


Ondansetron Odt [Zofran Odt] 4 mg TL Q6H PRN #30 tablet


 PRN Reason: Nausea / Vomiting


Comments: 


Your platelets remained elevated and are likely contributing to your symptoms.  

It is important that you continue with the hydroxyurea and make sure that you 

see your hematologist this week.  You should stay hydrated and take motrin or 

tylenol as needed for pain.  You can take the zofran as needed for nausea.  You 

may return to the emergency department at any time for new, worsening or 

uncontrollable symptoms.

## 2018-05-03 ENCOUNTER — HOSPITAL ENCOUNTER (OUTPATIENT)
Dept: HOSPITAL 76 - LAB | Age: 28
Discharge: HOME | End: 2018-05-03
Attending: PHYSICIAN ASSISTANT
Payer: MEDICAID

## 2018-05-03 DIAGNOSIS — D47.3: Primary | ICD-10-CM

## 2018-05-03 LAB
BASOPHILS NFR BLD AUTO: 0.1 10^3/UL (ref 0–0.1)
BASOPHILS NFR BLD AUTO: 1.4 %
EOSINOPHIL # BLD AUTO: 0.1 10^3/UL (ref 0–0.7)
EOSINOPHIL NFR BLD AUTO: 2.4 %
ERYTHROCYTE [DISTWIDTH] IN BLOOD BY AUTOMATED COUNT: 15 % (ref 12–15)
HGB UR QL STRIP: 15.1 G/DL (ref 14–18)
LYMPHOCYTES # SPEC AUTO: 1.4 10^3/UL (ref 1.5–3.5)
LYMPHOCYTES NFR BLD AUTO: 32.3 %
MCH RBC QN AUTO: 31.6 PG (ref 27–31)
MCHC RBC AUTO-ENTMCNC: 32.6 G/DL (ref 32–36)
MCV RBC AUTO: 96.9 FL (ref 80–94)
MONOCYTES # BLD AUTO: 0.2 10^3/UL (ref 0–1)
MONOCYTES NFR BLD AUTO: 5.5 %
NEUTROPHILS # BLD AUTO: 2.6 10^3/UL (ref 1.5–6.6)
NEUTROPHILS # SNV AUTO: 4.4 X10^3/UL (ref 4.8–10.8)
NEUTROPHILS NFR BLD AUTO: 58.4 %
PDW BLD AUTO: 7.3 FL (ref 7.4–11.4)
PLAT MORPH BLD: (no result)
PLATELET # BLD: 763 10^3/UL (ref 130–450)
PLATELET BLD QL SMEAR: (no result)
RBC MAR: 4.76 10^6/UL (ref 4.7–6.1)

## 2018-05-03 PROCEDURE — 85025 COMPLETE CBC W/AUTO DIFF WBC: CPT

## 2018-05-03 PROCEDURE — 36415 COLL VENOUS BLD VENIPUNCTURE: CPT

## 2018-08-09 ENCOUNTER — HOSPITAL ENCOUNTER (EMERGENCY)
Dept: HOSPITAL 76 - ED | Age: 28
LOS: 1 days | Discharge: HOME | End: 2018-08-10
Payer: MEDICAID

## 2018-08-09 DIAGNOSIS — R11.2: ICD-10-CM

## 2018-08-09 DIAGNOSIS — R51: Primary | ICD-10-CM

## 2018-08-09 LAB
ALBUMIN DIAFP-MCNC: 4.4 G/DL (ref 3.2–5.5)
ALBUMIN/GLOB SERPL: 1.6 {RATIO} (ref 1–2.2)
ALP SERPL-CCNC: 53 IU/L (ref 42–121)
ALT SERPL W P-5'-P-CCNC: 20 IU/L (ref 10–60)
ANION GAP SERPL CALCULATED.4IONS-SCNC: 9 MMOL/L (ref 6–13)
AST SERPL W P-5'-P-CCNC: 26 IU/L (ref 10–42)
BASOPHILS NFR BLD AUTO: 0.1 10^3/UL (ref 0–0.1)
BASOPHILS NFR BLD AUTO: 0.9 %
BILIRUB BLD-MCNC: 1 MG/DL (ref 0.2–1)
BUN SERPL-MCNC: 10 MG/DL (ref 6–20)
CALCIUM UR-MCNC: 9 MG/DL (ref 8.5–10.3)
CHLORIDE SERPL-SCNC: 101 MMOL/L (ref 101–111)
CO2 SERPL-SCNC: 25 MMOL/L (ref 21–32)
CREAT SERPLBLD-SCNC: 1 MG/DL (ref 0.6–1.2)
EOSINOPHIL # BLD AUTO: 0.1 10^3/UL (ref 0–0.7)
EOSINOPHIL NFR BLD AUTO: 0.6 %
ERYTHROCYTE [DISTWIDTH] IN BLOOD BY AUTOMATED COUNT: 14.1 % (ref 12–15)
GFRSERPLBLD MDRD-ARVRAT: 89 ML/MIN/{1.73_M2} (ref 89–?)
GLOBULIN SER-MCNC: 2.8 G/DL (ref 2.1–4.2)
GLUCOSE SERPL-MCNC: 118 MG/DL (ref 70–100)
HGB UR QL STRIP: 15.4 G/DL (ref 14–18)
LIPASE SERPL-CCNC: 29 U/L (ref 22–51)
LYMPHOCYTES # SPEC AUTO: 2.1 10^3/UL (ref 1.5–3.5)
LYMPHOCYTES NFR BLD AUTO: 19.4 %
MCH RBC QN AUTO: 32.8 PG (ref 27–31)
MCHC RBC AUTO-ENTMCNC: 34.2 G/DL (ref 32–36)
MCV RBC AUTO: 95.9 FL (ref 80–94)
MONOCYTES # BLD AUTO: 0.5 10^3/UL (ref 0–1)
MONOCYTES NFR BLD AUTO: 4.7 %
NEUTROPHILS # BLD AUTO: 8.1 10^3/UL (ref 1.5–6.6)
NEUTROPHILS # SNV AUTO: 10.9 X10^3/UL (ref 4.8–10.8)
NEUTROPHILS NFR BLD AUTO: 74.4 %
PDW BLD AUTO: 7.9 FL (ref 7.4–11.4)
PLAT MORPH BLD: (no result)
PLATELET # BLD: 918 10^3/UL (ref 130–450)
PLATELET BLD QL SMEAR: (no result)
PROT SPEC-MCNC: 7.2 G/DL (ref 6.7–8.2)
RBC MAR: 4.69 10^6/UL (ref 4.7–6.1)
RBC MORPH BLD: (no result)
SODIUM SERPLBLD-SCNC: 135 MMOL/L (ref 135–145)

## 2018-08-09 PROCEDURE — 99284 EMERGENCY DEPT VISIT MOD MDM: CPT

## 2018-08-09 PROCEDURE — 80053 COMPREHEN METABOLIC PANEL: CPT

## 2018-08-09 PROCEDURE — 85025 COMPLETE CBC W/AUTO DIFF WBC: CPT

## 2018-08-09 PROCEDURE — 83690 ASSAY OF LIPASE: CPT

## 2018-08-09 PROCEDURE — 96361 HYDRATE IV INFUSION ADD-ON: CPT

## 2018-08-09 PROCEDURE — 96376 TX/PRO/DX INJ SAME DRUG ADON: CPT

## 2018-08-09 PROCEDURE — 96375 TX/PRO/DX INJ NEW DRUG ADDON: CPT

## 2018-08-09 PROCEDURE — 96374 THER/PROPH/DIAG INJ IV PUSH: CPT

## 2018-08-09 PROCEDURE — 99283 EMERGENCY DEPT VISIT LOW MDM: CPT

## 2018-08-09 PROCEDURE — 36415 COLL VENOUS BLD VENIPUNCTURE: CPT

## 2018-08-10 VITALS — SYSTOLIC BLOOD PRESSURE: 129 MMHG | DIASTOLIC BLOOD PRESSURE: 85 MMHG

## 2018-08-10 NOTE — ED PHYSICIAN DOCUMENTATION
History of Present Illness





- Stated complaint


Stated Complaint: NAUSEA/VOMITING





- Chief complaint


Chief Complaint: Abd Pain





- History obtained from


History obtained from: Patient, Family





- History of Present Illness


Timing: Enter  time (17:00), Today


Pain level now: 5


Improved by: no ameliorating factors


Worsened by: no exacerbating factors





- Treatment prior to arrival


Treatment prior to arrival: 





c/o headache, dizziness, nausea, vomiting, generalized abdominal pain. Symptoms 

started 5 PM today.





Review of Systems


Constitutional: denies: Fever, Chills, Sweats


Cardiac: reports: Reviewed and negative


Respiratory: reports: Reviewed and negative


GI: reports: Abdominal Pain, Nausea, Vomiting.  denies: Abdominal Swelling, 

Constipation, Diarrhea


: denies: Dysuria, Frequency


Neurologic: reports: Headache.  denies: Generalized weakness, Focal weakness, 

Numbness, Confused, Altered mental status





PD PAST MEDICAL HISTORY





- Past Medical History


Cardiovascular: None


Respiratory: None


Endocrine/Autoimmune: None


GI: None


: None


HEENT: None


Psych: Depression, Anxiety


Musculoskeletal: None


Derm: None





- Past Surgical History


Past Surgical History: No





- Present Medications


Home Medications: 


 Ambulatory Orders











 Medication  Instructions  Recorded  Confirmed


 


Bupropion HCl [Bupropion Xl] 150 mg PO DAILY 04/02/17 07/30/18


 


Citalopram [CeleXA] 20 mg PO DAILY 04/02/17 07/30/18


 


Hydroxyurea 2 cap PO DAILY 12/21/17 07/30/18


 


Ondansetron Odt [Zofran Odt] 4 mg TL Q6H PRN #30 tablet 01/19/18 07/30/18


 


Ibuprofen 400 mg PO DAILY 02/12/18 07/30/18


 


Anagrelide HCl 1 mg PO DAILY 03/06/18 07/30/18


 


Ondansetron Odt [Zofran] 4 mg TL Q6H PRN #10 tablet 08/10/18 














- Allergies


Allergies/Adverse Reactions: 


 Allergies











Allergy/AdvReac Type Severity Reaction Status Date / Time


 


No Known Drug Allergies Allergy   Verified 08/09/18 20:52














- Social History


Does the pt smoke?: No


Smoking Status: Never smoker


Does the pt drink ETOH?: No


Does the pt have substance abuse?: No





- Immunizations


Immunizations are current?: Yes





- POLST


Patient has POLST: No





PD ED PE NORMAL





- Vitals


Vital signs reviewed: Yes





- General


General: Alert and oriented X 3, No acute distress, Well developed/nourished





- HEENT


HEENT: PERRL, EOMI, Other (dry mucous membranes)





- Neck


Neck: Supple, no meningeal sign





- Cardiac


Cardiac: RRR, No murmur





- Respiratory


Respiratory: No respiratory distress, Clear bilaterally





- Abdomen


Abdomen: Normal bowel sounds, Soft, Non tender, Non distended





- Derm


Derm: Normal color, Warm and dry





Results





- Vitals


Vitals: 


 Vital Signs - 24 hr











  08/09/18 08/09/18 08/09/18





  20:48 22:02 22:55


 


Temperature 36.2 C L  36.0 C L


 


Heart Rate 71 69 68


 


Respiratory 14 16 12





Rate   


 


Blood Pressure 119/81 H 122/82 H 125/86 H


 


O2 Saturation 100 98 100














  08/09/18 08/10/18





  23:44 00:44


 


Temperature  36.6 C


 


Heart Rate 98 85


 


Respiratory 18 14





Rate  


 


Blood Pressure 120/68 129/85 H


 


O2 Saturation 99 97








 Oxygen











O2 Source                      Room air

















- Labs


Labs: 


 Laboratory Tests











  08/09/18 08/09/18





  22:00 22:00


 


WBC  10.9 H 


 


RBC  4.69 L 


 


Hgb  15.4 


 


Hct  45.0 


 


MCV  95.9 H 


 


MCH  32.8 H 


 


MCHC  34.2 


 


RDW  14.1 


 


Plt Count  918 H* 


 


MPV  7.9 


 


Neut # (Auto)  8.1 H 


 


Lymph # (Auto)  2.1 


 


Mono # (Auto)  0.5 


 


Eos # (Auto)  0.1 


 


Baso # (Auto)  0.1 


 


Absolute Nucleated RBC  0.00 


 


Nucleated RBC %  0.0 


 


Manual Slide Review  Indicated 


 


Platelet Estimate  INCREASED (>450,000) 


 


Platelet Morphology  1+ GIANT PLATELETS 


 


RBC Morph Micro Appear  NORMAL APPEARANCE 


 


Sodium   135


 


Potassium   3.5


 


Chloride   101


 


Carbon Dioxide   25


 


Anion Gap   9.0


 


BUN   10


 


Creatinine   1.0


 


Estimated GFR (MDRD)   89


 


Glucose   118 H


 


Calcium   9.0


 


Total Bilirubin   1.0


 


AST   26


 


ALT   20


 


Alkaline Phosphatase   53


 


Total Protein   7.2


 


Albumin   4.4


 


Globulin   2.8


 


Albumin/Globulin Ratio   1.6


 


Lipase   29














PD MEDICAL DECISION MAKING





- ED course


Complexity details: reviewed old records, reviewed results, re-evaluated patient

, considered differential, d/w patient, d/w family


ED course: 





On reevaluation after IV fluids, zofran, and toradol, patient has moist mucous 

membranes. He is in NAD and reports feeling significantly better. Given second 

dose of IV zofran for some residual nausea and rx for ODT zofran. 





- Sepsis Event


Vital Signs: 


 Vital Signs - 24 hr











  08/09/18 08/09/18 08/09/18





  20:48 22:02 22:55


 


Temperature 36.2 C L  36.0 C L


 


Heart Rate 71 69 68


 


Respiratory 14 16 12





Rate   


 


Blood Pressure 119/81 H 122/82 H 125/86 H


 


O2 Saturation 100 98 100














  08/09/18 08/10/18





  23:44 00:44


 


Temperature  36.6 C


 


Heart Rate 98 85


 


Respiratory 18 14





Rate  


 


Blood Pressure 120/68 129/85 H


 


O2 Saturation 99 97








 Oxygen











O2 Source                      Room air

















Departure





- Departure


Disposition: 01 Home, Self Care


Clinical Impression: 


Headache


Qualifiers:


 Headache type: unspecified Headache chronicity pattern: acute headache 

Intractability: not intractable Qualified Code(s): R51 - Headache





Vomiting


Qualifiers:


 Vomiting type: unspecified Vomiting Intractability: non-intractable Nausea 

presence: with nausea Qualified Code(s): R11.2 - Nausea with vomiting, 

unspecified





Condition: Good


Instructions:  ED Cephalgia Unspecified, ED Nausea Vomiting


Follow-Up: 


Jermain Mclaughlin MD [Primary Care Provider] - 


Prescriptions: 


Ondansetron Odt [Zofran] 4 mg TL Q6H PRN #10 tablet


 PRN Reason: Nausea / Vomiting


Discharge Date/Time: 08/10/18 00:57

## 2018-09-05 ENCOUNTER — HOSPITAL ENCOUNTER (EMERGENCY)
Dept: HOSPITAL 76 - ED | Age: 28
Discharge: HOME | End: 2018-09-05
Payer: MEDICAID

## 2018-09-05 VITALS — SYSTOLIC BLOOD PRESSURE: 107 MMHG | DIASTOLIC BLOOD PRESSURE: 64 MMHG

## 2018-09-05 DIAGNOSIS — R51: Primary | ICD-10-CM

## 2018-09-05 PROCEDURE — 96374 THER/PROPH/DIAG INJ IV PUSH: CPT

## 2018-09-05 PROCEDURE — 99284 EMERGENCY DEPT VISIT MOD MDM: CPT

## 2018-09-05 PROCEDURE — 99283 EMERGENCY DEPT VISIT LOW MDM: CPT

## 2018-09-05 PROCEDURE — 96361 HYDRATE IV INFUSION ADD-ON: CPT

## 2018-09-05 PROCEDURE — 96375 TX/PRO/DX INJ NEW DRUG ADDON: CPT

## 2018-09-05 NOTE — ED PHYSICIAN DOCUMENTATION
History of Present Illness





- Stated complaint


Stated Complaint: HEADACHE/NAUSEA





- Chief complaint


Chief Complaint: Neuro





- History obtained from


History obtained from: Patient





- Additonal information


Additional information: 





28-year-old male with a history of chronic headaches secondary to his ongoing 

medical therapy presents the emergency department with 1 hour of his typical 

headache symptoms.  The patient's headache gradually worsened.  The patient 

reports feeling nauseous.  The patient took Zofran with no relief.  The patient 

reports frontal pain.  The patient denies any focal motor weakness.  The 

patient denies radiation into his neck or neck pain.  No fevers or chills.  No 

other associated symptoms.  Symptoms are described as moderate.





Review of Systems


Constitutional: denies: Fever, Chills


Eyes: denies: Discharge


Ears: denies: Ear pain


Nose: denies: Congestion


Throat: denies: Oral lesions / sores


Cardiac: denies: Chest pain / pressure


Respiratory: denies: Dyspnea


GI: denies: Abdominal Pain


Skin: denies: Rash


Musculoskeletal: denies: Neck pain, Back pain


Neurologic: reports: Headache


Immunocompromised: denies: Chemotherapy





PD PAST MEDICAL HISTORY





- Past Medical History


Past Medical History: Yes


Cardiovascular: None


Respiratory: None


Endocrine/Autoimmune: None


GI: None


: None


HEENT: None


Psych: Depression, Anxiety


Musculoskeletal: None


Derm: None


Other Past Medical History: Bone Marrow disease





- Past Surgical History


Past Surgical History: No





- Present Medications


Home Medications: 


 Ambulatory Orders











 Medication  Instructions  Recorded  Confirmed


 


Bupropion HCl [Bupropion Xl] 150 mg PO DAILY 04/02/17 09/05/18


 


Hydroxyurea 2 cap PO DAILY 12/21/17 09/05/18


 


Ondansetron Odt [Zofran Odt] 4 mg TL Q6H PRN #30 tablet 01/19/18 09/05/18


 


Ibuprofen 400 mg PO DAILY 02/12/18 09/05/18


 


Anagrelide HCl 1 mg PO DAILY 03/06/18 09/05/18














- Allergies


Allergies/Adverse Reactions: 


 Allergies











Allergy/AdvReac Type Severity Reaction Status Date / Time


 


No Known Drug Allergies Allergy   Verified 09/05/18 21:50














- Social History


Does the pt smoke?: No


Smoking Status: Never smoker


Does the pt drink ETOH?: No


Does the pt have substance abuse?: No





- Immunizations


Immunizations are current?: Yes





- POLST


Patient has POLST: No





PD ED PE NORMAL





- General


General: Alert and oriented X 3, No acute distress





- HEENT


HEENT: Atraumatic, PERRL, EOMI





- Neck


Neck: Supple, no meningeal sign





- Cardiac


Cardiac: RRR





- Respiratory


Respiratory: No respiratory distress





- Abdomen


Abdomen: Soft, Non tender





- Derm


Derm: Normal color





- Neuro


Neuro: Alert and oriented X 3, CNs 2-12 intact, No motor deficit, No sensory 

deficit, Normal speech





- Psych


Psych: Normal mood





Results





- Vitals


Vitals: 


 Vital Signs - 24 hr











  09/05/18 09/05/18 09/05/18





  21:45 22:19 22:57


 


Temperature 36.6 C  


 


Heart Rate 90 89 86


 


Respiratory 16 16 14





Rate   


 


Blood Pressure 123/76 105/66 112/60


 


O2 Saturation 97 100 100














  09/05/18





  23:13


 


Temperature 36.5 C


 


Heart Rate 80


 


Respiratory 16





Rate 


 


Blood Pressure 107/64


 


O2 Saturation 100








 Oxygen











O2 Source                      Room air

















PD MEDICAL DECISION MAKING





- ED course


ED course: 





Reevaluation the patient resting comfortably and his symptoms appear to be 

under control.  The patient's history and physical exam are not suggestive of 

an intracranial hemorrhage, aneurysmal bleed or meningitis and currently there 

is no indication for further workup in the emergency department.  The patient 

appears appropriate for discharge and reevaluation as outpatient.  I discussed 

warning signs and recommended returning to the emergency department immediately 

for worsening or any concerns





- Sepsis Event


Vital Signs: 


 Vital Signs - 24 hr











  09/05/18 09/05/18 09/05/18





  21:45 22:19 22:57


 


Temperature 36.6 C  


 


Heart Rate 90 89 86


 


Respiratory 16 16 14





Rate   


 


Blood Pressure 123/76 105/66 112/60


 


O2 Saturation 97 100 100














  09/05/18





  23:13


 


Temperature 36.5 C


 


Heart Rate 80


 


Respiratory 16





Rate 


 


Blood Pressure 107/64


 


O2 Saturation 100








 Oxygen











O2 Source                      Room air

















Departure





- Departure


Disposition: 01 Home, Self Care


Clinical Impression: 


Headache


Qualifiers:


 Headache type: unspecified Headache chronicity pattern: acute headache 

Intractability: not intractable Qualified Code(s): R51 - Headache





Condition: Good


Instructions:  ED Cephalgia Unspecified


Follow-Up: 


Jermain Mclaughlin MD [Primary Care Provider] - Within 1 week


Comments: 


Please return to the ER for worsening symptoms or any concerns

## 2021-01-11 ENCOUNTER — HOSPITAL ENCOUNTER (OUTPATIENT)
Dept: HOSPITAL 76 - LAB | Age: 31
Discharge: HOME | End: 2021-01-11
Attending: INTERNAL MEDICINE
Payer: MEDICAID

## 2021-01-11 DIAGNOSIS — D47.3: Primary | ICD-10-CM

## 2021-01-11 LAB
ALBUMIN DIAFP-MCNC: 4.8 G/DL (ref 3.2–5.5)
ALBUMIN/GLOB SERPL: 2 {RATIO} (ref 1–2.2)
ALP SERPL-CCNC: 101 IU/L (ref 42–121)
ALT SERPL W P-5'-P-CCNC: 64 IU/L (ref 10–60)
ANION GAP SERPL CALCULATED.4IONS-SCNC: 7 MMOL/L (ref 6–13)
AST SERPL W P-5'-P-CCNC: 59 IU/L (ref 10–42)
BASOPHILS NFR BLD AUTO: 0.1 10^3/UL (ref 0–0.1)
BASOPHILS NFR BLD AUTO: 1.3 %
BILIRUB BLD-MCNC: 0.6 MG/DL (ref 0.2–1)
BUN SERPL-MCNC: 18 MG/DL (ref 6–20)
CALCIUM UR-MCNC: 9.3 MG/DL (ref 8.5–10.3)
CHLORIDE SERPL-SCNC: 105 MMOL/L (ref 101–111)
CO2 SERPL-SCNC: 26 MMOL/L (ref 21–32)
CREAT SERPLBLD-SCNC: 1.1 MG/DL (ref 0.6–1.2)
EOSINOPHIL # BLD AUTO: 0.1 10^3/UL (ref 0–0.7)
EOSINOPHIL NFR BLD AUTO: 1 %
ERYTHROCYTE [DISTWIDTH] IN BLOOD BY AUTOMATED COUNT: 14.9 % (ref 12–15)
GLOBULIN SER-MCNC: 2.4 G/DL (ref 2.1–4.2)
GLUCOSE SERPL-MCNC: 94 MG/DL (ref 70–100)
HGB UR QL STRIP: 13.9 G/DL (ref 14–18)
LYMPHOCYTES # SPEC AUTO: 1.9 10^3/UL (ref 1.5–3.5)
LYMPHOCYTES NFR BLD AUTO: 21.6 %
MCH RBC QN AUTO: 29 PG (ref 27–31)
MCHC RBC AUTO-ENTMCNC: 31.7 G/DL (ref 32–36)
MCV RBC AUTO: 91.5 FL (ref 80–94)
MONOCYTES # BLD AUTO: 0.5 10^3/UL (ref 0–1)
MONOCYTES NFR BLD AUTO: 6.2 %
NEUTROPHILS # BLD AUTO: 5.9 10^3/UL (ref 1.5–6.6)
NEUTROPHILS # SNV AUTO: 8.6 X10^3/UL (ref 4.8–10.8)
NEUTROPHILS NFR BLD AUTO: 68.3 %
PDW BLD AUTO: 9.3 FL (ref 7.4–11.4)
PLAT MORPH BLD: (no result)
PLATELET # BLD: 1031 10^3/UL (ref 130–450)
PLATELET BLD QL SMEAR: (no result)
PROT SPEC-MCNC: 7.2 G/DL (ref 6.7–8.2)
RBC MAR: 4.8 10^6/UL (ref 4.7–6.1)
RBC MORPH BLD: (no result)
SODIUM SERPLBLD-SCNC: 138 MMOL/L (ref 135–145)

## 2021-01-11 PROCEDURE — 85025 COMPLETE CBC W/AUTO DIFF WBC: CPT

## 2021-01-11 PROCEDURE — 36415 COLL VENOUS BLD VENIPUNCTURE: CPT

## 2021-01-11 PROCEDURE — 80053 COMPREHEN METABOLIC PANEL: CPT

## 2022-02-01 ENCOUNTER — HOSPITAL ENCOUNTER (OUTPATIENT)
Dept: HOSPITAL 76 - LAB | Age: 32
Discharge: HOME | End: 2022-02-01
Attending: FAMILY MEDICINE
Payer: MEDICAID

## 2022-02-01 DIAGNOSIS — D47.3: Primary | ICD-10-CM

## 2022-02-01 LAB
ALBUMIN DIAFP-MCNC: 4.4 G/DL (ref 3.2–5.5)
ALBUMIN/GLOB SERPL: 1.6 {RATIO} (ref 1–2.2)
ALP SERPL-CCNC: 74 IU/L (ref 42–121)
ALT SERPL W P-5'-P-CCNC: 32 IU/L (ref 10–60)
ANION GAP SERPL CALCULATED.4IONS-SCNC: 10 MMOL/L (ref 6–13)
AST SERPL W P-5'-P-CCNC: 25 IU/L (ref 10–42)
BASOPHILS NFR BLD AUTO: 0.1 10^3/UL (ref 0–0.1)
BASOPHILS NFR BLD AUTO: 1.9 %
BILIRUB BLD-MCNC: 0.5 MG/DL (ref 0.2–1)
BUN SERPL-MCNC: 11 MG/DL (ref 6–20)
CALCIUM UR-MCNC: 9 MG/DL (ref 8.5–10.3)
CHLORIDE SERPL-SCNC: 100 MMOL/L (ref 101–111)
CO2 SERPL-SCNC: 27 MMOL/L (ref 21–32)
CREAT SERPLBLD-SCNC: 1 MG/DL (ref 0.6–1.2)
EOSINOPHIL # BLD AUTO: 0.1 10^3/UL (ref 0–0.7)
EOSINOPHIL NFR BLD AUTO: 1.9 %
ERYTHROCYTE [DISTWIDTH] IN BLOOD BY AUTOMATED COUNT: 14.9 % (ref 12–15)
GFRSERPLBLD MDRD-ARVRAT: 87 ML/MIN/{1.73_M2} (ref 89–?)
GLOBULIN SER-MCNC: 2.7 G/DL (ref 2.1–4.2)
GLUCOSE SERPL-MCNC: 84 MG/DL (ref 70–100)
HCT VFR BLD AUTO: 43.6 % (ref 42–52)
HGB UR QL STRIP: 14.2 G/DL (ref 14–18)
LYMPHOCYTES # SPEC AUTO: 1.4 10^3/UL (ref 1.5–3.5)
LYMPHOCYTES NFR BLD AUTO: 20.1 %
MCH RBC QN AUTO: 29.6 PG (ref 27–31)
MCHC RBC AUTO-ENTMCNC: 32.6 G/DL (ref 32–36)
MCV RBC AUTO: 91 FL (ref 80–94)
MONOCYTES # BLD AUTO: 0.5 10^3/UL (ref 0–1)
MONOCYTES NFR BLD AUTO: 7.3 %
NEUTROPHILS # BLD AUTO: 4.6 10^3/UL (ref 1.5–6.6)
NEUTROPHILS # SNV AUTO: 6.8 X10^3/UL (ref 4.8–10.8)
NEUTROPHILS NFR BLD AUTO: 66.8 %
NRBC # BLD AUTO: 0.02 X10^3/UL
NRBC # BLD AUTO: 0.3 /100WBC
PDW BLD AUTO: 9.3 FL (ref 7.4–11.4)
PLATELET # BLD: 969 10^3/UL (ref 130–450)
POTASSIUM SERPL-SCNC: 4.2 MMOL/L (ref 3.5–5)
PROT SPEC-MCNC: 7.1 G/DL (ref 6.7–8.2)
RBC MAR: 4.79 10^6/UL (ref 4.7–6.1)
SODIUM SERPLBLD-SCNC: 137 MMOL/L (ref 135–145)

## 2022-02-01 PROCEDURE — 82728 ASSAY OF FERRITIN: CPT

## 2022-02-01 PROCEDURE — 85025 COMPLETE CBC W/AUTO DIFF WBC: CPT

## 2022-02-01 PROCEDURE — 36415 COLL VENOUS BLD VENIPUNCTURE: CPT

## 2022-02-01 PROCEDURE — 80053 COMPREHEN METABOLIC PANEL: CPT

## 2023-07-21 NOTE — CT REPORT
EXAM:

CT HEAD

 

EXAM DATE: 12/26/2017 10:41 AM.

 

CLINICAL HISTORY: Headache x5 months with light sensitivity and visual disturbance in right eye.

 

COMPARISON: 12/21/2017 and 04/02/2017.

 

TECHNIQUE: Multiaxial CT images were obtained from the foramen magnum to the vertex. Reformats: Coron
al. IV contrast: None.

 

In accordance with CT protocol optimization, one or more of the following dose reduction techniques w
ere utilized for this exam: automated exposure control, adjustment of mA and/or KV based on patient s
ize, or use of iterative reconstructive technique.

 

FINDINGS:

Parenchyma: No intraparenchymal hemorrhage, mass effect, or CT findings of evolving acute/subacute in
farct. Gray-white differentiation is distinct. 

 

Extraaxial Spaces: Normal for age. No subdural or epidural collections identified.

 

Ventricles: The ventricles and basal cisterns are patent. No hydrocephalus or midline shift.

 

Sinuses and Orbits: Imaged paranasal sinuses, orbits, and mastoids show no significant abnormality.

 

Bones: No evidence of fracture or calvarial defect.

 

Other: None.

 

IMPRESSION: Normal head CT.

 

[facility]

Referring Provider Line: [phone]

 

SITE ID: [id number]